# Patient Record
Sex: FEMALE | URBAN - METROPOLITAN AREA
[De-identification: names, ages, dates, MRNs, and addresses within clinical notes are randomized per-mention and may not be internally consistent; named-entity substitution may affect disease eponyms.]

---

## 2022-07-16 ENCOUNTER — OFFICE (OUTPATIENT)
Dept: URBAN - METROPOLITAN AREA CLINIC 93 | Facility: CLINIC | Age: 67
Setting detail: OPHTHALMOLOGY
End: 2022-07-16
Payer: COMMERCIAL

## 2022-07-16 ENCOUNTER — RX ONLY (RX ONLY)
Age: 67
End: 2022-07-16

## 2022-07-16 VITALS — HEIGHT: 65 IN | BODY MASS INDEX: 20.83 KG/M2 | WEIGHT: 125 LBS

## 2022-07-16 DIAGNOSIS — H52.4: ICD-10-CM

## 2022-07-16 DIAGNOSIS — H35.3131: ICD-10-CM

## 2022-07-16 DIAGNOSIS — H43.812: ICD-10-CM

## 2022-07-16 DIAGNOSIS — H16.223: ICD-10-CM

## 2022-07-16 DIAGNOSIS — H25.13: ICD-10-CM

## 2022-07-16 PROCEDURE — 92134 CPTRZ OPH DX IMG PST SGM RTA: CPT | Performed by: OPHTHALMOLOGY

## 2022-07-16 PROCEDURE — 92015 DETERMINE REFRACTIVE STATE: CPT | Performed by: OPHTHALMOLOGY

## 2022-07-16 PROCEDURE — 99204 OFFICE O/P NEW MOD 45 MIN: CPT | Performed by: OPHTHALMOLOGY

## 2022-07-16 PROCEDURE — 92202 OPSCPY EXTND ON/MAC DRAW: CPT | Performed by: OPHTHALMOLOGY

## 2022-07-16 ASSESSMENT — KERATOMETRY
OD_AXISANGLE_DEGREES: 097
OS_K1POWER_DIOPTERS: 44.00
OD_K2POWER_DIOPTERS: 44.50
OD_K1POWER_DIOPTERS: 44.00
OS_K2POWER_DIOPTERS: 45.00
OS_AXISANGLE_DEGREES: 084

## 2022-07-16 ASSESSMENT — REFRACTION_AUTOREFRACTION
OD_AXIS: 017
OD_CYLINDER: -0.75
OS_CYLINDER: -1.75
OS_SPHERE: -1.00
OD_SPHERE: +0.75
OS_AXIS: 175

## 2022-07-16 ASSESSMENT — REFRACTION_MANIFEST
OS_VA1: 20/25
OD_CYLINDER: -0.75
OS_CYLINDER: -2.00
OS_SPHERE: -1.00
OD_AXIS: 015
OD_VA1: 20/25
OD_ADD: +2.50
OS_AXIS: 175
OD_SPHERE: +0.25
OS_ADD: +2.50

## 2022-07-16 ASSESSMENT — REFRACTION_CURRENTRX
OS_OVR_VA: 20/
OD_AXIS: 041
OS_SPHERE: -2.50
OD_VPRISM_DIRECTION: PROGS
OS_VPRISM_DIRECTION: PROGS
OS_CYLINDER: -1.00
OS_AXIS: 159
OS_ADD: +2.25
OD_CYLINDER: -0.50
OD_OVR_VA: 20/
OD_SPHERE: -1.00
OD_ADD: +2.25

## 2022-07-16 ASSESSMENT — SPHEQUIV_DERIVED
OD_SPHEQUIV: 0.375
OS_SPHEQUIV: -2
OS_SPHEQUIV: -1.875
OD_SPHEQUIV: -0.125

## 2022-07-16 ASSESSMENT — TONOMETRY
OD_IOP_MMHG: 14
OS_IOP_MMHG: 14

## 2022-07-16 ASSESSMENT — SUPERFICIAL PUNCTATE KERATITIS (SPK)
OS_SPK: 2+
OD_SPK: 2+

## 2022-07-16 ASSESSMENT — CONFRONTATIONAL VISUAL FIELD TEST (CVF)
OS_FINDINGS: FULL
OD_FINDINGS: FULL

## 2022-07-16 ASSESSMENT — AXIALLENGTH_DERIVED
OD_AL: 23.3673
OS_AL: 23.9608
OS_AL: 24.0111
OD_AL: 23.1777

## 2022-07-16 ASSESSMENT — VISUAL ACUITY
OS_BCVA: 20/50+2
OD_BCVA: 20/25-2

## 2023-07-25 ENCOUNTER — OFFICE (OUTPATIENT)
Facility: LOCATION | Age: 68
Setting detail: OPHTHALMOLOGY
End: 2023-07-25
Payer: COMMERCIAL

## 2023-07-25 DIAGNOSIS — H16.223: ICD-10-CM

## 2023-07-25 DIAGNOSIS — H35.3131: ICD-10-CM

## 2023-07-25 DIAGNOSIS — H25.13: ICD-10-CM

## 2023-07-25 DIAGNOSIS — H43.812: ICD-10-CM

## 2023-07-25 PROCEDURE — 99214 OFFICE O/P EST MOD 30 MIN: CPT | Performed by: OPHTHALMOLOGY

## 2023-07-25 ASSESSMENT — REFRACTION_MANIFEST
OS_CYLINDER: -2.00
OS_VA1: 20/25
OD_AXIS: 015
OD_VA1: 20/25
OS_SPHERE: -1.00
OS_AXIS: 175
OS_ADD: +2.50
OD_CYLINDER: -0.75
OD_SPHERE: +0.25
OD_ADD: +2.50

## 2023-07-25 ASSESSMENT — KERATOMETRY
OD_K1POWER_DIOPTERS: 43.75
OS_K2POWER_DIOPTERS: 45.50
OD_K2POWER_DIOPTERS: 4.25
OD_AXISANGLE_DEGREES: 095
OS_K1POWER_DIOPTERS: 44.00
OS_AXISANGLE_DEGREES: 083

## 2023-07-25 ASSESSMENT — REFRACTION_CURRENTRX
OD_VPRISM_DIRECTION: PROGS
OS_SPHERE: -2.50
OS_AXIS: 159
OD_OVR_VA: 20/
OD_SPHERE: -1.00
OS_OVR_VA: 20/
OS_VPRISM_DIRECTION: PROGS
OD_CYLINDER: -2.00
OD_CYLINDER: -0.50
OS_OVR_VA: 20/
OD_ADD: +2.25
OS_SPHERE: +0.50
OS_ADD: +2.25
OD_OVR_VA: 20/
OS_CYLINDER: -1.00
OD_SPHERE: -0.50
OD_AXIS: 019
OS_CYLINDER: -1.00
OS_AXIS: 001
OD_AXIS: 041

## 2023-07-25 ASSESSMENT — REFRACTION_AUTOREFRACTION
OD_CYLINDER: -0.50
OS_CYLINDER: -1.50
OS_SPHERE: -1.25
OS_AXIS: 170
OD_AXIS: 019
OD_SPHERE: +0.75

## 2023-07-25 ASSESSMENT — CONFRONTATIONAL VISUAL FIELD TEST (CVF)
OD_FINDINGS: FULL
OS_FINDINGS: FULL

## 2023-07-25 ASSESSMENT — AXIALLENGTH_DERIVED
OS_AL: 23.9164
OD_AL: 33.9777
OD_AL: 33.48
OS_AL: 23.9164

## 2023-07-25 ASSESSMENT — SPHEQUIV_DERIVED
OS_SPHEQUIV: -2
OS_SPHEQUIV: -2
OD_SPHEQUIV: -0.125
OD_SPHEQUIV: 0.5

## 2023-07-25 ASSESSMENT — TONOMETRY
OD_IOP_MMHG: 17
OS_IOP_MMHG: 17

## 2023-07-25 ASSESSMENT — SUPERFICIAL PUNCTATE KERATITIS (SPK)
OS_SPK: 2+
OD_SPK: 2+

## 2023-07-25 ASSESSMENT — VISUAL ACUITY
OS_BCVA: 20/20-
OD_BCVA: 20/30

## 2024-07-31 ENCOUNTER — OFFICE (OUTPATIENT)
Facility: LOCATION | Age: 69
Setting detail: OPHTHALMOLOGY
End: 2024-07-31
Payer: COMMERCIAL

## 2024-07-31 DIAGNOSIS — H35.3131: ICD-10-CM

## 2024-07-31 DIAGNOSIS — H16.223: ICD-10-CM

## 2024-07-31 DIAGNOSIS — H25.13: ICD-10-CM

## 2024-07-31 DIAGNOSIS — H02.831: ICD-10-CM

## 2024-07-31 DIAGNOSIS — H02.834: ICD-10-CM

## 2024-07-31 DIAGNOSIS — H43.812: ICD-10-CM

## 2024-07-31 PROCEDURE — 92134 CPTRZ OPH DX IMG PST SGM RTA: CPT | Performed by: OPHTHALMOLOGY

## 2024-07-31 PROCEDURE — 92014 COMPRE OPH EXAM EST PT 1/>: CPT | Performed by: OPHTHALMOLOGY

## 2024-07-31 ASSESSMENT — CONFRONTATIONAL VISUAL FIELD TEST (CVF)
OD_FINDINGS: FULL
OS_FINDINGS: FULL

## 2024-07-31 ASSESSMENT — LID POSITION - DERMATOCHALASIS
OS_DERMATOCHALASIS: LUL 1+
OD_DERMATOCHALASIS: RUL 1+

## 2024-08-24 ENCOUNTER — APPOINTMENT (OUTPATIENT)
Dept: GASTROENTEROLOGY | Facility: CLINIC | Age: 69
End: 2024-08-24
Payer: COMMERCIAL

## 2024-08-24 VITALS
WEIGHT: 124 LBS | HEIGHT: 65 IN | DIASTOLIC BLOOD PRESSURE: 75 MMHG | HEART RATE: 76 BPM | SYSTOLIC BLOOD PRESSURE: 130 MMHG | BODY MASS INDEX: 20.66 KG/M2 | OXYGEN SATURATION: 97 %

## 2024-08-24 DIAGNOSIS — K20.90 ESOPHAGITIS, UNSPECIFIED WITHOUT BLEEDING: ICD-10-CM

## 2024-08-24 DIAGNOSIS — K58.9 IRRITABLE BOWEL SYNDROME W/OUT DIARRHEA: ICD-10-CM

## 2024-08-24 PROBLEM — Z00.00 ENCOUNTER FOR PREVENTIVE HEALTH EXAMINATION: Status: ACTIVE | Noted: 2024-08-24

## 2024-08-24 PROCEDURE — 99214 OFFICE O/P EST MOD 30 MIN: CPT

## 2024-08-24 RX ORDER — GLYCOPYRROLATE 1 MG/1
1 TABLET ORAL 3 TIMES DAILY
Qty: 270 | Refills: 3 | Status: ACTIVE | COMMUNITY
Start: 2024-08-24 | End: 1900-01-01

## 2024-08-24 RX ORDER — DICYCLOMINE HYDROCHLORIDE 10 MG/1
10 CAPSULE ORAL
Qty: 270 | Refills: 3 | Status: ACTIVE | COMMUNITY
Start: 2024-08-24 | End: 1900-01-01

## 2024-08-24 NOTE — PHYSICAL EXAM
[Normal Voice/Communication] : normal voice/communication [Alert] : alert [Healthy Appearing] : healthy appearing [No Acute Distress] : no acute distress [Sclera] : the sclera and conjunctiva were normal [Hearing Threshold Finger Rub Not Harris] : hearing was normal [Normal Lips/Gums] : the lips and gums were normal [Oropharynx] : the oropharynx was normal [Normal Appearance] : the appearance of the neck was normal [No Neck Mass] : no neck mass was observed [No Respiratory Distress] : no respiratory distress [No Acc Muscle Use] : no accessory muscle use [Respiration, Rhythm And Depth] : normal respiratory rhythm and effort [Auscultation Breath Sounds / Voice Sounds] : lungs were clear to auscultation bilaterally [Heart Rate And Rhythm] : heart rate was normal and rhythm regular [Normal S1, S2] : normal S1 and S2 [Murmurs] : no murmurs [Abdomen Tenderness] : non-tender [Bowel Sounds] : normal bowel sounds [No Masses] : no abdominal mass palpated [Abdomen Soft] : soft [] : no hepatosplenomegaly [Oriented To Time, Place, And Person] : oriented to person, place, and time

## 2024-08-24 NOTE — HISTORY OF PRESENT ILLNESS
[FreeTextEntry1] : Chief complaint: gerd, spastic colon   HPI: Patient presents for Gastroenterology followup because of multiple complex gastrointestinal signs and symptoms. Patient with intermittent crampy rlq and llq abdominal discomfort associated with some postprandial bloating and nausea   She is trying to adhere to a low fodmap diet  At times with dyspepsia and gastritis   Prescriptions sent for glycopyrrolate and dicyclomine   moderate degree of complexity of medical decision making involved in this patient encounter

## 2024-08-24 NOTE — ASSESSMENT
[FreeTextEntry1] : Patient presents for evaluation and management of spastic colon flare and gastritis flare   low fodmap diet reviewed with patient  new prescriptions for glycopyrrolate and dicyclomine sent to pharmacy  last colonoscopy and histopathology reports were reviewed

## 2025-09-08 ENCOUNTER — RX RENEWAL (OUTPATIENT)
Age: 70
End: 2025-09-08